# Patient Record
Sex: FEMALE | Race: WHITE
[De-identification: names, ages, dates, MRNs, and addresses within clinical notes are randomized per-mention and may not be internally consistent; named-entity substitution may affect disease eponyms.]

---

## 2017-04-04 ENCOUNTER — HOSPITAL ENCOUNTER (OUTPATIENT)
Dept: HOSPITAL 45 - C.LABSPEC | Age: 77
Discharge: HOME | End: 2017-04-04
Attending: INTERNAL MEDICINE
Payer: COMMERCIAL

## 2017-04-04 DIAGNOSIS — R53.83: ICD-10-CM

## 2017-04-04 DIAGNOSIS — R42: ICD-10-CM

## 2017-04-04 DIAGNOSIS — M62.81: Primary | ICD-10-CM

## 2017-04-04 LAB
ALBUMIN/GLOB SERPL: 1.2 {RATIO} (ref 0.9–2)
ALP SERPL-CCNC: 71 U/L (ref 45–117)
ALT SERPL-CCNC: 30 U/L (ref 12–78)
ANION GAP SERPL CALC-SCNC: 9 MMOL/L (ref 3–11)
AST SERPL-CCNC: 24 U/L (ref 15–37)
BASOPHILS # BLD: 0.04 K/UL (ref 0–0.2)
BASOPHILS NFR BLD: 0.5 %
BUN SERPL-MCNC: 25 MG/DL (ref 7–18)
BUN/CREAT SERPL: 32.5 (ref 10–20)
CALCIUM SERPL-MCNC: 9.1 MG/DL (ref 8.5–10.1)
CHLORIDE SERPL-SCNC: 108 MMOL/L (ref 98–107)
CO2 SERPL-SCNC: 24 MMOL/L (ref 21–32)
COMPLETE: YES
CREAT SERPL-MCNC: 0.76 MG/DL (ref 0.6–1.2)
EOSINOPHIL NFR BLD AUTO: 320 K/UL (ref 130–400)
GLOBULIN SER-MCNC: 3.2 GM/DL (ref 2.5–4)
GLUCOSE SERPL-MCNC: 83 MG/DL (ref 70–99)
HCT VFR BLD CALC: 41 % (ref 37–47)
IG%: 0.3 %
IMM GRANULOCYTES NFR BLD AUTO: 24.4 %
LYMPHOCYTES # BLD: 1.83 K/UL (ref 1.2–3.4)
MAGNESIUM SERPL-MCNC: 2.6 MG/DL (ref 1.8–2.4)
MCH RBC QN AUTO: 30.5 PG (ref 25–34)
MCHC RBC AUTO-ENTMCNC: 34.4 G/DL (ref 32–36)
MCV RBC AUTO: 88.6 FL (ref 80–100)
MONOCYTES NFR BLD: 11.7 %
NEUTROPHILS # BLD AUTO: 3.5 %
NEUTROPHILS NFR BLD AUTO: 59.6 %
PMV BLD AUTO: 10 FL (ref 7.4–10.4)
POTASSIUM SERPL-SCNC: 4.2 MMOL/L (ref 3.5–5.1)
RBC # BLD AUTO: 4.63 M/UL (ref 4.2–5.4)
SODIUM SERPL-SCNC: 141 MMOL/L (ref 136–145)
TSH SERPL-ACNC: 1.05 UIU/ML (ref 0.3–4.5)
WBC # BLD AUTO: 7.49 K/UL (ref 4.8–10.8)

## 2017-04-12 ENCOUNTER — HOSPITAL ENCOUNTER (OUTPATIENT)
Dept: HOSPITAL 45 - C.MRI | Age: 77
Discharge: HOME | End: 2017-04-12
Attending: INTERNAL MEDICINE
Payer: COMMERCIAL

## 2017-04-12 DIAGNOSIS — R42: Primary | ICD-10-CM

## 2017-04-12 DIAGNOSIS — M62.81: ICD-10-CM

## 2017-04-12 NOTE — DIAGNOSTIC IMAGING REPORT
Brain MRI WITH AND WITHOUT CONTRAST



HISTORY:      Leg weakness. Balance problems.



TECHNIQUE: Multiplanar multisequence MRI of the brain was performed both before

and after the intravenous administration of contrast.



COMPARISON STUDY:  None.



FINDINGS: There is no mass, hematoma, midline shift, or acute infarct. The

paranasal sinuses are clear. The mastoid air cells are clear. The ventricles and

sulci demonstrate moderate age-related involutional changes. Scattered foci of

T2 hyperintensity seen within the periventricular and subcortical white matter

are nonspecific but suggestive of mild microvascular ischemic changes. The major

vascular flow voids at the skull base are well-maintained. There is a small

developmental venous anomaly in the right cerebral hemisphere. This is

considered to be a normal variant.



IMPRESSION:  

No acute intracranial abnormality. Moderate atrophy and mild microvascular

ischemic change.







Electronically signed by:  Mayo Truong M.D.

4/12/2017 1:13 PM



Dictated Date/Time:  4/12/2017 1:04 PM

## 2017-05-26 ENCOUNTER — HOSPITAL ENCOUNTER (OUTPATIENT)
Dept: HOSPITAL 45 - C.LABSPEC | Age: 77
Discharge: HOME | End: 2017-05-26
Attending: INTERNAL MEDICINE
Payer: COMMERCIAL

## 2017-05-26 DIAGNOSIS — E78.5: Primary | ICD-10-CM

## 2017-05-26 LAB
CHOLEST/HDLC SERPL: 4.1 {RATIO}
GLUCOSE UR QL: 39 MG/DL
NITRITE UR QL STRIP: 153 MG/DL (ref 0–150)
PH UR: 159 MG/DL (ref 0–200)
VERY LOW DENSITY LIPOPROT CALC: 31 MG/DL

## 2017-09-06 ENCOUNTER — HOSPITAL ENCOUNTER (OUTPATIENT)
Dept: HOSPITAL 45 - C.LABSPEC | Age: 77
Discharge: HOME | End: 2017-09-06
Attending: INTERNAL MEDICINE
Payer: COMMERCIAL

## 2017-09-06 DIAGNOSIS — E78.5: ICD-10-CM

## 2017-09-06 DIAGNOSIS — E55.9: ICD-10-CM

## 2017-09-06 DIAGNOSIS — I10: ICD-10-CM

## 2017-09-06 DIAGNOSIS — E03.9: Primary | ICD-10-CM

## 2017-09-06 LAB
ANION GAP SERPL CALC-SCNC: 6 MMOL/L (ref 3–11)
BUN SERPL-MCNC: 22 MG/DL (ref 7–18)
BUN/CREAT SERPL: 26.5 (ref 10–20)
CALCIUM SERPL-MCNC: 9.9 MG/DL (ref 8.5–10.1)
CHLORIDE SERPL-SCNC: 108 MMOL/L (ref 98–107)
CHOLEST/HDLC SERPL: 5 {RATIO}
CO2 SERPL-SCNC: 25 MMOL/L (ref 21–32)
CREAT SERPL-MCNC: 0.82 MG/DL (ref 0.6–1.2)
GLUCOSE SERPL-MCNC: 91 MG/DL (ref 70–99)
GLUCOSE UR QL: 42 MG/DL
NITRITE UR QL STRIP: 208 MG/DL (ref 0–150)
PH UR: 209 MG/DL (ref 0–200)
POTASSIUM SERPL-SCNC: 5.1 MMOL/L (ref 3.5–5.1)
SODIUM SERPL-SCNC: 139 MMOL/L (ref 136–145)
TSH SERPL-ACNC: 1.96 UIU/ML (ref 0.3–4.5)
VERY LOW DENSITY LIPOPROT CALC: 42 MG/DL

## 2017-09-07 ENCOUNTER — HOSPITAL ENCOUNTER (OUTPATIENT)
Dept: HOSPITAL 45 - C.LABSPEC | Age: 77
Discharge: HOME | End: 2017-09-07
Attending: INTERNAL MEDICINE
Payer: COMMERCIAL

## 2017-09-07 DIAGNOSIS — Z12.11: Primary | ICD-10-CM

## 2017-09-27 ENCOUNTER — HOSPITAL ENCOUNTER (OUTPATIENT)
Dept: HOSPITAL 45 - C.MAMM | Age: 77
Discharge: HOME | End: 2017-09-27
Attending: INTERNAL MEDICINE
Payer: COMMERCIAL

## 2017-09-27 DIAGNOSIS — Z12.31: Primary | ICD-10-CM

## 2017-09-27 NOTE — MAMMOGRAPHY REPORT
BILATERAL DIGITAL SCREENING MAMMOGRAM WITH CAD: 9/27/2017

CLINICAL HISTORY: Routine screening.  Patient has no complaints.  





TECHNIQUE:  Current study was also evaluated with a Computer Aided Detection (CAD) system.  Bilateral
 CC and MLO views were obtained.



COMPARISON: Comparison is made to exams dated:  9/16/2016 mammogram, 9/14/2015 mammogram, 9/8/2014 ma
mmogram, 8/28/2013 mammogram, 8/27/2012 mammogram, and 8/24/2011 mammogram - [facility]
nter.   



BREAST COMPOSITION:  There are scattered areas of fibroglandular density in both breasts.  



FINDINGS:  No suspicious masses, calcifications, or areas of architectural distortion are noted in ei
ther breast. There has been no significant interval change compared to prior exams.  Bilateral asymme
tries and scattered bilateral benign-appearing calcifications are not significantly changed.





IMPRESSION:  ACR BI-RADS CATEGORY 2: BENIGN

There is no mammographic evidence of malignancy. A 1 year screening mammogram is recommended.  The pa
tient will receive written notification of the results.  





Approximately 10% of breast cancers are not detected with mammography. A negative mammographic report
 should not delay biopsy if a clinically suggestive mass is present.



Ronda Rivera M.D.          

ah/:9/27/2017 11:08:56  



Imaging Technologist: Merari WEINSTEIN)(M), [facility]

letter sent: Normal 1/2  

BI-RADS Code: ACR BI-RADS Category 2: Benign

## 2018-04-11 ENCOUNTER — HOSPITAL ENCOUNTER (OUTPATIENT)
Dept: HOSPITAL 45 - C.RAD1850 | Age: 78
Discharge: HOME | End: 2018-04-11
Attending: INTERNAL MEDICINE
Payer: COMMERCIAL

## 2018-04-11 DIAGNOSIS — R05: Primary | ICD-10-CM

## 2018-04-11 NOTE — DIAGNOSTIC IMAGING REPORT
TWO VIEW CHEST



CLINICAL HISTORY: Cough.



FINDINGS: PA and lateral chest radiographs are compared to study dated /2412 and

correlated with chest CT dated 1/6/2009. The cardiomediastinal silhouette is

unremarkable. There is atherosclerotic calcification of the thoracic aorta.

Suture material projects over the right upper lobe. Volume loss in the right

lung indicates previous surgical resection. Chronic interstitial thickening is

similar to previous. No airspace consolidation or pleural effusion is

identified. There is no pneumothorax. The skeletal structures are osteopenic.

Chronic postoperative/posttraumatic change is identified in the right sided

ribs. Advanced arthritic change is seen in the shoulders, left greater than

right. Calcified joint bodies are suggested in the left shoulder.



IMPRESSION: 



1. No active disease in the chest.



2. Postoperative change is again seen in the right upper lobe.







Electronically signed by:  Hai Overton M.D.

4/11/2018 12:18 PM



Dictated Date/Time:  4/11/2018 12:15 PM

## 2018-05-07 ENCOUNTER — HOSPITAL ENCOUNTER (OUTPATIENT)
Dept: HOSPITAL 45 - C.MRI | Age: 78
Discharge: HOME | End: 2018-05-07
Attending: PHYSICIAN ASSISTANT
Payer: COMMERCIAL

## 2018-05-07 DIAGNOSIS — M48.062: ICD-10-CM

## 2018-05-07 DIAGNOSIS — R29.898: ICD-10-CM

## 2018-05-07 DIAGNOSIS — M51.26: ICD-10-CM

## 2018-05-07 DIAGNOSIS — R20.0: Primary | ICD-10-CM

## 2018-05-07 NOTE — DIAGNOSTIC IMAGING REPORT
LUMBAR SPINE W/O CONTRAST



CLINICAL HISTORY: 77 years-old Female presenting with R20.0 [id number]

Neurogenic [id number] Bilateral leg weakness, neurogenic claudication. 



TECHNIQUE: Multisequence, multiplanar MR imaging of the lumbar spine was

performed without the use of intravenous contrast. IV contrast: None. 



COMPARISON: None.



FINDINGS:



Localizer images: Prominent cyst at the lower pole of the right kidney.



Straightening of normal lumbar lordosis likely due to multilevel degenerative

changes. 5 mm of anterolisthesis of L2 on L3. Trace retrolisthesis of L3 on L4

and L4 on L5. Sclerotic endplate changes evident at L4-5. Fatty endplate changes

evident at L3-4. Lesions in L4 likely represent benign hemangioma though one of

which is fat poor and therefore indeterminate. Benign hemangioma also noted in

T12 and L1. Diffuse intervertebral disc desiccation with significant height loss

most severe at L3-4 though also present at L2-3, L4-5, and L5-S1 to lesser

degrees. Multilevel degenerative changes further detailed below:



L1-2: No significant neural foraminal or spinal canal stenosis.



L2-3: Disc bulge and anterolisthesis in combination with facet arthropathy

mildly narrow the spinal canal. Mild bilateral neural foraminal narrowing.



L3-4: Minimal disc bulge does not significantly narrow the spinal canal. Mild

right neural foraminal narrowing.



L4-5: Disc bulge is eccentrically more severe on the right resulting in mass

effect on the exiting right L4 nerve root at the level of the far lateral right

neural foramen. Moderate bilateral neural foraminal narrowing.



L5-S1: Disc bulge and facet arthropathy result in moderate bilateral neural

foraminal narrowing. No significant spinal canal stenosis.



Spinal cord is in good position at the superior endplate of L1. Cauda equina

normal in morphology. Paraspinal musculature demonstrates mild fatty atrophy.

Right renal cyst again noted.



IMPRESSION:

1.  Multilevel degenerative changes of the lumbar spine with varying degrees of

neural foraminal narrowing most severe at L4-5. Disc bulge at L4-5 is

eccentrically more severe on the right resulting in mass effect on the exiting

right L4 nerve root at the level of the far right lateral neural foramen. No

significant spinal canal stenosis.







Electronically signed by:  Parviz Stewart M.D.

5/7/2018 11:16 AM



Dictated Date/Time:  5/7/2018 11:10 AM